# Patient Record
(demographics unavailable — no encounter records)

---

## 2025-01-27 NOTE — DISCUSSION/SUMMARY
[de-identified] : I went over the pathophysiology of the patient's symptoms in great detail with the patient. I discussed the underlying pathophysiology of the patient's condition in great detail with the patient. I went over the patient's x-rays with them in great detail. We discussed the use of ice, Tylenol and anti-inflammatories to relieve pain. He should control the motion in his elbow. Instructions were discussed with the patient. At-home strengthening exercises were discussed and demonstrated with the patient. He should focus on light weight and high repetition exercises. He should avoid pushups and planks in extension.   All of their questions were answered. They understand and consent to the plan.   FU if the pain acts up.

## 2025-01-27 NOTE — PHYSICAL EXAM
[de-identified] : Constitutional o Appearance : well-nourished, well developed, alert, in no acute distress  Head and Face o Head :  Inspection : atraumatic, normocephalic o Face :  Inspection : no visible rash or discoloration Respiratory o Respiratory Effort: breathing unlabored  Neurologic o Sensation : Normal sensation  Psychiatric o Mood and Affect: mood normal, affect appropriate  Lymphatic o Additional Nodes : No palpable lymph nodes present   Cervical Spine o Inspection/Palpation :  Inspection : alignment midline, normal degree of lordosis present  Skin : normal appearance, no masses or tenderness, trachea midline  Palpation : musculature is nontender to palpation o Range of Motion : arc of motion full in all planes, no crepitance or pain with ROM o Tests: Negative Spurlings test  Right Upper Extremity o Right Shoulder :  Inspection/Palpation : anterior capsular tenderness, no swelling or deformities  Range of Motion : full and painless in all planes, cross chest adduction and reissted abudction does not cause discofmort no crepitance  Strength : forward elevation 5/5, IR 5/5, ER 5/5, ER at 90 of abduction 5/5, supraspinatus 5/5, adduction 5/5, abduction 5/5, biceps/triceps 5/5,  5/5  Stability : no joint instability on provocative testing   Tests: Galan negative, Neer negative, Radha negative, drop arm test negative, Ottawa's test negative  o Right Elbow :  Inspection/Palpation : no soft spot tenderness, no medial epicondyle tenderness no swelling or deformities  Range of Motion : , lacks full motion no crepitance  Strength : flexion and extension 5/5  Stability : no joint instability on provocative testing  o Sensation : sensation intact to light touch o Tests: Tinels Sign negative, no pain with resisted pronation or supination, no pain with resisted extension of the middle finger, no pain with resisted extension of the wrist, no pain with gripping   Gait and Station: Gait: gait normal, no significant extremity swelling or lymphedema, good proprioception and balance  Radiology Results 1/27/2025  o Right Shoulder : AP internal/external rotation and outlet views were obtained and revealed sclerosis of the greater tuberosity degenerative arthritis of the AC joint and mild degenerative arthritis of the glenohumeral joint  o Right Elbow : AP, lateral and oblique views were obtained and revealed severe degenerative arthritis right elbow with calcification of the medial and lateral distal humerus and multiple lose bodies

## 2025-01-27 NOTE — ADDENDUM
[FreeTextEntry1] : I, SHERRELL MAGALLANES, acted solely as a scribe for Dr. Jack Ngo on this date 01/27/2025.  All medical record entries made by the Scribe were at my, Dr. Jack Ngo, direction and personally dictated by me on 01/27/2025. I have reviewed the chart and agree that the record accurately reflects my personal performance of the history, physical exam, assessment and plan. I have also personally directed, reviewed, and agreed with the chart.

## 2025-01-27 NOTE — HISTORY OF PRESENT ILLNESS
[de-identified] : 66 year old male presents complaining of right shoulder and right elbow pain. The patient cannot attribute his pain to any injury, fall, or trauma. He lifts very light weights 3 days a week but can over do it. He complains of right shoulder pain and denies loss of motion. He complains of a right shoulder lump that was very painful. He admits to icing the lump which alleviated the pain. The swelling has subsided. He denies tenderness of the shoulder and elbow today. Patient denies that he has any numbness or tingling. He recalls shoveling show and thinks that might have aggravated the right shoulder and elbow pain. Patient denies taking any medication to manage his pain.